# Patient Record
Sex: MALE | Race: WHITE | NOT HISPANIC OR LATINO | Employment: OTHER | URBAN - NONMETROPOLITAN AREA
[De-identification: names, ages, dates, MRNs, and addresses within clinical notes are randomized per-mention and may not be internally consistent; named-entity substitution may affect disease eponyms.]

---

## 2022-01-01 ENCOUNTER — DOCUMENTATION (OUTPATIENT)
Dept: CT IMAGING | Facility: HOSPITAL | Age: 52
End: 2022-01-01

## 2022-01-01 ENCOUNTER — APPOINTMENT (OUTPATIENT)
Dept: CT IMAGING | Facility: HOSPITAL | Age: 52
End: 2022-01-01

## 2022-01-01 ENCOUNTER — OFFICE VISIT (OUTPATIENT)
Dept: GASTROENTEROLOGY | Facility: CLINIC | Age: 52
End: 2022-01-01

## 2022-01-01 ENCOUNTER — HOSPITAL ENCOUNTER (EMERGENCY)
Facility: HOSPITAL | Age: 52
Discharge: HOME | End: 2022-04-27
Attending: EMERGENCY MEDICINE | Admitting: EMERGENCY MEDICINE

## 2022-01-01 ENCOUNTER — APPOINTMENT (OUTPATIENT)
Dept: ULTRASOUND IMAGING | Facility: HOSPITAL | Age: 52
End: 2022-01-01

## 2022-01-01 ENCOUNTER — TELEPHONE (OUTPATIENT)
Dept: UROLOGY | Facility: CLINIC | Age: 52
End: 2022-01-01

## 2022-01-01 ENCOUNTER — APPOINTMENT (OUTPATIENT)
Dept: CARDIOLOGY | Facility: HOSPITAL | Age: 52
End: 2022-01-01

## 2022-01-01 ENCOUNTER — OFFICE VISIT (OUTPATIENT)
Dept: UROLOGY | Facility: CLINIC | Age: 52
End: 2022-01-01

## 2022-01-01 ENCOUNTER — HOSPITAL ENCOUNTER (INPATIENT)
Facility: HOSPITAL | Age: 52
LOS: 4 days | Discharge: HOME | End: 2022-08-29
Attending: FAMILY MEDICINE | Admitting: INTERNAL MEDICINE

## 2022-01-01 ENCOUNTER — APPOINTMENT (OUTPATIENT)
Dept: GENERAL RADIOLOGY | Facility: HOSPITAL | Age: 52
End: 2022-01-01

## 2022-01-01 VITALS
SYSTOLIC BLOOD PRESSURE: 138 MMHG | DIASTOLIC BLOOD PRESSURE: 72 MMHG | BODY MASS INDEX: 31.5 KG/M2 | HEIGHT: 70 IN | HEART RATE: 79 BPM | WEIGHT: 220 LBS | OXYGEN SATURATION: 98 % | TEMPERATURE: 97.1 F

## 2022-01-01 VITALS
HEIGHT: 69 IN | HEART RATE: 71 BPM | TEMPERATURE: 98.8 F | SYSTOLIC BLOOD PRESSURE: 166 MMHG | OXYGEN SATURATION: 97 % | DIASTOLIC BLOOD PRESSURE: 81 MMHG | WEIGHT: 246 LBS | BODY MASS INDEX: 36.43 KG/M2 | RESPIRATION RATE: 18 BRPM

## 2022-01-01 VITALS
HEART RATE: 63 BPM | TEMPERATURE: 97.2 F | OXYGEN SATURATION: 94 % | RESPIRATION RATE: 18 BRPM | DIASTOLIC BLOOD PRESSURE: 59 MMHG | HEIGHT: 69 IN | BODY MASS INDEX: 38.03 KG/M2 | SYSTOLIC BLOOD PRESSURE: 130 MMHG | WEIGHT: 256.8 LBS

## 2022-01-01 VITALS — WEIGHT: 220 LBS | HEIGHT: 70 IN | BODY MASS INDEX: 31.5 KG/M2 | TEMPERATURE: 98.6 F

## 2022-01-01 DIAGNOSIS — Z86.16 HISTORY OF COVID-19: ICD-10-CM

## 2022-01-01 DIAGNOSIS — E80.6 HYPERBILIRUBINEMIA: ICD-10-CM

## 2022-01-01 DIAGNOSIS — R06.89 DECREASED LUNG SOUNDS: ICD-10-CM

## 2022-01-01 DIAGNOSIS — R60.1 ANASARCA: Primary | ICD-10-CM

## 2022-01-01 DIAGNOSIS — R19.37 GENERALIZED ABDOMINAL RIGIDITY: ICD-10-CM

## 2022-01-01 DIAGNOSIS — R63.4 WEIGHT LOSS: ICD-10-CM

## 2022-01-01 DIAGNOSIS — R63.0 LOSS OF APPETITE: ICD-10-CM

## 2022-01-01 DIAGNOSIS — Z82.49 FAMILY HISTORY OF CONGESTIVE HEART FAILURE: ICD-10-CM

## 2022-01-01 DIAGNOSIS — N50.819 PAIN IN TESTICLE, UNSPECIFIED LATERALITY: Primary | ICD-10-CM

## 2022-01-01 DIAGNOSIS — R14.0 ABDOMINAL DISTENTION: ICD-10-CM

## 2022-01-01 DIAGNOSIS — I50.33 ACUTE ON CHRONIC DIASTOLIC HEART FAILURE: ICD-10-CM

## 2022-01-01 DIAGNOSIS — R60.9 DEPENDENT EDEMA: ICD-10-CM

## 2022-01-01 DIAGNOSIS — I31.39 PERICARDIAL EFFUSION WITHOUT CARDIAC TAMPONADE: ICD-10-CM

## 2022-01-01 DIAGNOSIS — R60.0 BILATERAL LEG EDEMA: Primary | ICD-10-CM

## 2022-01-01 DIAGNOSIS — R53.83 MALAISE AND FATIGUE: ICD-10-CM

## 2022-01-01 DIAGNOSIS — R60.0 PEDAL EDEMA: ICD-10-CM

## 2022-01-01 DIAGNOSIS — N50.89 SCROTAL SWELLING: ICD-10-CM

## 2022-01-01 DIAGNOSIS — R01.1 MURMUR: ICD-10-CM

## 2022-01-01 DIAGNOSIS — R53.81 MALAISE AND FATIGUE: ICD-10-CM

## 2022-01-01 DIAGNOSIS — R93.89 ABNORMAL FINDING ON IMAGING: ICD-10-CM

## 2022-01-01 DIAGNOSIS — N50.89 SCROTAL EDEMA: ICD-10-CM

## 2022-01-01 DIAGNOSIS — R60.0 BILATERAL LOWER EXTREMITY EDEMA: Primary | ICD-10-CM

## 2022-01-01 DIAGNOSIS — R60.1 GENERALIZED EDEMA: ICD-10-CM

## 2022-01-01 LAB
ALBUMIN SERPL-MCNC: 3.7 G/DL (ref 3.5–5.2)
ALBUMIN SERPL-MCNC: 3.9 G/DL (ref 3.5–5.2)
ALBUMIN SERPL-MCNC: 3.9 G/DL (ref 3.5–5.2)
ALBUMIN SERPL-MCNC: 4.1 G/DL (ref 3.5–5.2)
ALBUMIN/GLOB SERPL: 1.7 G/DL
ALBUMIN/GLOB SERPL: 1.7 G/DL
ALBUMIN/GLOB SERPL: 1.8 G/DL
ALBUMIN/GLOB SERPL: 2.1 G/DL
ALP SERPL-CCNC: 111 U/L (ref 39–117)
ALP SERPL-CCNC: 121 U/L (ref 39–117)
ALP SERPL-CCNC: 126 U/L (ref 39–117)
ALP SERPL-CCNC: 86 U/L (ref 39–117)
ALT SERPL W P-5'-P-CCNC: 32 U/L (ref 1–41)
ALT SERPL W P-5'-P-CCNC: 67 U/L (ref 1–41)
ALT SERPL W P-5'-P-CCNC: 75 U/L (ref 1–41)
ALT SERPL W P-5'-P-CCNC: 75 U/L (ref 1–41)
ANION GAP SERPL CALCULATED.3IONS-SCNC: 10 MMOL/L (ref 5–15)
ANION GAP SERPL CALCULATED.3IONS-SCNC: 6 MMOL/L (ref 5–15)
ANION GAP SERPL CALCULATED.3IONS-SCNC: 8 MMOL/L (ref 5–15)
ANION GAP SERPL CALCULATED.3IONS-SCNC: 8 MMOL/L (ref 5–15)
ANION GAP SERPL CALCULATED.3IONS-SCNC: 9 MMOL/L (ref 5–15)
ANISOCYTOSIS BLD QL: ABNORMAL
AST SERPL-CCNC: 49 U/L (ref 1–40)
AST SERPL-CCNC: 63 U/L (ref 1–40)
AST SERPL-CCNC: 74 U/L (ref 1–40)
AST SERPL-CCNC: 76 U/L (ref 1–40)
B2 MICROGLOB SERPL-MCNC: 3.8 MG/L (ref 0.8–2.2)
BASOPHILS # BLD AUTO: 0.08 10*3/MM3 (ref 0–0.2)
BASOPHILS # BLD AUTO: 0.11 10*3/MM3 (ref 0–0.2)
BASOPHILS NFR BLD AUTO: 0.7 % (ref 0–1.5)
BASOPHILS NFR BLD AUTO: 0.8 % (ref 0–1.5)
BH CV ECHO LEFT VENTRICLE GLOBAL LONGITUDINAL STRAIN: -3.8 %
BH CV ECHO MEAS - AO MAX PG: 9.5 MMHG
BH CV ECHO MEAS - AO MEAN PG: 5 MMHG
BH CV ECHO MEAS - AO ROOT DIAM: 3.2 CM
BH CV ECHO MEAS - AO V2 MAX: 154 CM/SEC
BH CV ECHO MEAS - AO V2 VTI: 36.9 CM
BH CV ECHO MEAS - AVA(I,D): 3.2 CM2
BH CV ECHO MEAS - EDV(CUBED): 74.6 ML
BH CV ECHO MEAS - EDV(MOD-SP2): 112 ML
BH CV ECHO MEAS - EDV(MOD-SP4): 150 ML
BH CV ECHO MEAS - EF(MOD-BP): 70.6 %
BH CV ECHO MEAS - EF(MOD-SP2): 63.5 %
BH CV ECHO MEAS - EF(MOD-SP4): 75.2 %
BH CV ECHO MEAS - ESV(CUBED): 11.2 ML
BH CV ECHO MEAS - ESV(MOD-SP2): 40.9 ML
BH CV ECHO MEAS - ESV(MOD-SP4): 37.2 ML
BH CV ECHO MEAS - FS: 46.8 %
BH CV ECHO MEAS - IVS/LVPW: 0.83 CM
BH CV ECHO MEAS - IVSD: 1.53 CM
BH CV ECHO MEAS - LA DIMENSION: 4.3 CM
BH CV ECHO MEAS - LAT PEAK E' VEL: 12 CM/SEC
BH CV ECHO MEAS - LV DIASTOLIC VOL/BSA (35-75): 65.9 CM2
BH CV ECHO MEAS - LV MASS(C)D: 302.3 GRAMS
BH CV ECHO MEAS - LV MAX PG: 7 MMHG
BH CV ECHO MEAS - LV MEAN PG: 4 MMHG
BH CV ECHO MEAS - LV SYSTOLIC VOL/BSA (12-30): 16.4 CM2
BH CV ECHO MEAS - LV V1 MAX: 132 CM/SEC
BH CV ECHO MEAS - LV V1 VTI: 33.9 CM
BH CV ECHO MEAS - LVIDD: 4.2 CM
BH CV ECHO MEAS - LVIDS: 2.24 CM
BH CV ECHO MEAS - LVOT AREA: 3.5 CM2
BH CV ECHO MEAS - LVOT DIAM: 2.1 CM
BH CV ECHO MEAS - LVPWD: 1.85 CM
BH CV ECHO MEAS - MED PEAK E' VEL: 6.7 CM/SEC
BH CV ECHO MEAS - MV A MAX VEL: 73.7 CM/SEC
BH CV ECHO MEAS - MV DEC SLOPE: 719 CM/SEC2
BH CV ECHO MEAS - MV DEC TIME: 0.19 MSEC
BH CV ECHO MEAS - MV E MAX VEL: 137 CM/SEC
BH CV ECHO MEAS - MV E/A: 1.86
BH CV ECHO MEAS - MV MAX PG: 7.4 MMHG
BH CV ECHO MEAS - MV MEAN PG: 3 MMHG
BH CV ECHO MEAS - MV V2 VTI: 32.9 CM
BH CV ECHO MEAS - MVA(VTI): 3.6 CM2
BH CV ECHO MEAS - PA V2 MAX: 110 CM/SEC
BH CV ECHO MEAS - RAP SYSTOLE: 5 MMHG
BH CV ECHO MEAS - RV MAX PG: 3.4 MMHG
BH CV ECHO MEAS - RV V1 MAX: 91.6 CM/SEC
BH CV ECHO MEAS - RV V1 VTI: 19.2 CM
BH CV ECHO MEAS - RVDD: 4.1 CM
BH CV ECHO MEAS - RVSP: 45.4 MMHG
BH CV ECHO MEAS - SI(MOD-SP2): 31.3 ML/M2
BH CV ECHO MEAS - SI(MOD-SP4): 49.6 ML/M2
BH CV ECHO MEAS - SV(LVOT): 117.4 ML
BH CV ECHO MEAS - SV(MOD-SP2): 71.1 ML
BH CV ECHO MEAS - SV(MOD-SP4): 112.8 ML
BH CV ECHO MEAS - TR MAX PG: 40.4 MMHG
BH CV ECHO MEAS - TR MAX VEL: 318 CM/SEC
BH CV ECHO MEASUREMENTS AVERAGE E/E' RATIO: 14.65
BH CV XLRA - TDI S': 7.9 CM/SEC
BILIRUB SERPL-MCNC: 1.3 MG/DL (ref 0–1.2)
BILIRUB SERPL-MCNC: 1.5 MG/DL (ref 0–1.2)
BILIRUB SERPL-MCNC: 1.6 MG/DL (ref 0–1.2)
BILIRUB SERPL-MCNC: 2.5 MG/DL (ref 0–1.2)
BILIRUB UR QL STRIP: NEGATIVE
BILIRUB UR QL STRIP: NEGATIVE
BUN SERPL-MCNC: 13 MG/DL (ref 6–20)
BUN SERPL-MCNC: 15 MG/DL (ref 6–20)
BUN SERPL-MCNC: 15 MG/DL (ref 6–20)
BUN SERPL-MCNC: 18 MG/DL (ref 6–20)
BUN SERPL-MCNC: 6 MG/DL (ref 6–20)
BUN/CREAT SERPL: 15.3 (ref 7–25)
BUN/CREAT SERPL: 17.6 (ref 7–25)
BUN/CREAT SERPL: 19.2 (ref 7–25)
BUN/CREAT SERPL: 22.2 (ref 7–25)
BUN/CREAT SERPL: 9 (ref 7–25)
CALCIUM SPEC-SCNC: 8.4 MG/DL (ref 8.6–10.5)
CALCIUM SPEC-SCNC: 8.4 MG/DL (ref 8.6–10.5)
CALCIUM SPEC-SCNC: 8.5 MG/DL (ref 8.6–10.5)
CALCIUM SPEC-SCNC: 8.7 MG/DL (ref 8.6–10.5)
CALCIUM SPEC-SCNC: 9.2 MG/DL (ref 8.6–10.5)
CHLORIDE SERPL-SCNC: 107 MMOL/L (ref 98–107)
CHLORIDE SERPL-SCNC: 107 MMOL/L (ref 98–107)
CHLORIDE SERPL-SCNC: 108 MMOL/L (ref 98–107)
CHLORIDE SERPL-SCNC: 108 MMOL/L (ref 98–107)
CHLORIDE SERPL-SCNC: 113 MMOL/L (ref 98–107)
CHOLEST SERPL-MCNC: 83 MG/DL (ref 0–200)
CK MB SERPL-CCNC: 58.49 NG/ML
CK MB SERPL-CCNC: 59.34 NG/ML
CK SERPL-CCNC: 556 U/L (ref 20–200)
CK SERPL-CCNC: 641 U/L (ref 20–200)
CK SERPL-CCNC: 723 U/L (ref 20–200)
CK SERPL-CCNC: 920 U/L (ref 20–200)
CK SERPL-CCNC: 955 U/L (ref 20–200)
CLARITY UR: CLEAR
CLARITY UR: CLEAR
CLUMPED PLATELETS: PRESENT
CO2 SERPL-SCNC: 24 MMOL/L (ref 22–29)
CO2 SERPL-SCNC: 27 MMOL/L (ref 22–29)
CO2 SERPL-SCNC: 29 MMOL/L (ref 22–29)
CO2 SERPL-SCNC: 29 MMOL/L (ref 22–29)
CO2 SERPL-SCNC: 32 MMOL/L (ref 22–29)
COLOR UR: YELLOW
COLOR UR: YELLOW
CREAT SERPL-MCNC: 0.67 MG/DL (ref 0.76–1.27)
CREAT SERPL-MCNC: 0.78 MG/DL (ref 0.76–1.27)
CREAT SERPL-MCNC: 0.81 MG/DL (ref 0.76–1.27)
CREAT SERPL-MCNC: 0.85 MG/DL (ref 0.76–1.27)
CREAT SERPL-MCNC: 0.85 MG/DL (ref 0.76–1.27)
D DIMER PPP FEU-MCNC: 2.04 MCGFEU/ML (ref 0–0.5)
DEPRECATED RDW RBC AUTO: 69.3 FL (ref 37–54)
DEPRECATED RDW RBC AUTO: 71.1 FL (ref 37–54)
DEPRECATED RDW RBC AUTO: 71.9 FL (ref 37–54)
DEPRECATED RDW RBC AUTO: 74.1 FL (ref 37–54)
EGFRCR SERPLBLD CKD-EPI 2021: 105.9 ML/MIN/1.73
EGFRCR SERPLBLD CKD-EPI 2021: 105.9 ML/MIN/1.73
EGFRCR SERPLBLD CKD-EPI 2021: 107.4 ML/MIN/1.73
EGFRCR SERPLBLD CKD-EPI 2021: 108.6 ML/MIN/1.73
EGFRCR SERPLBLD CKD-EPI 2021: 113.7 ML/MIN/1.73
EOSINOPHIL # BLD AUTO: 0.17 10*3/MM3 (ref 0–0.4)
EOSINOPHIL # BLD AUTO: 0.21 10*3/MM3 (ref 0–0.4)
EOSINOPHIL # BLD MANUAL: 0.55 10*3/MM3 (ref 0–0.4)
EOSINOPHIL NFR BLD AUTO: 1.1 % (ref 0.3–6.2)
EOSINOPHIL NFR BLD AUTO: 2 % (ref 0.3–6.2)
EOSINOPHIL NFR BLD MANUAL: 4 % (ref 0.3–6.2)
ERYTHROCYTE [DISTWIDTH] IN BLOOD BY AUTOMATED COUNT: 18.6 % (ref 12.3–15.4)
ERYTHROCYTE [DISTWIDTH] IN BLOOD BY AUTOMATED COUNT: 18.9 % (ref 12.3–15.4)
ERYTHROCYTE [DISTWIDTH] IN BLOOD BY AUTOMATED COUNT: 19 % (ref 12.3–15.4)
ERYTHROCYTE [DISTWIDTH] IN BLOOD BY AUTOMATED COUNT: 19.3 % (ref 12.3–15.4)
FERRITIN SERPL-MCNC: 89.21 NG/ML (ref 30–400)
GLOBULIN UR ELPH-MCNC: 2 GM/DL
GLOBULIN UR ELPH-MCNC: 2.2 GM/DL
GLOBULIN UR ELPH-MCNC: 2.2 GM/DL
GLOBULIN UR ELPH-MCNC: 2.3 GM/DL
GLUCOSE BLDC GLUCOMTR-MCNC: 115 MG/DL (ref 70–130)
GLUCOSE BLDC GLUCOMTR-MCNC: 122 MG/DL (ref 70–130)
GLUCOSE BLDC GLUCOMTR-MCNC: 135 MG/DL (ref 70–130)
GLUCOSE BLDC GLUCOMTR-MCNC: 144 MG/DL (ref 70–130)
GLUCOSE BLDC GLUCOMTR-MCNC: 158 MG/DL (ref 70–130)
GLUCOSE BLDC GLUCOMTR-MCNC: 165 MG/DL (ref 70–130)
GLUCOSE BLDC GLUCOMTR-MCNC: 172 MG/DL (ref 70–130)
GLUCOSE BLDC GLUCOMTR-MCNC: 174 MG/DL (ref 70–130)
GLUCOSE BLDC GLUCOMTR-MCNC: 184 MG/DL (ref 70–130)
GLUCOSE BLDC GLUCOMTR-MCNC: 185 MG/DL (ref 70–130)
GLUCOSE BLDC GLUCOMTR-MCNC: 188 MG/DL (ref 70–130)
GLUCOSE BLDC GLUCOMTR-MCNC: 205 MG/DL (ref 70–130)
GLUCOSE BLDC GLUCOMTR-MCNC: 209 MG/DL (ref 70–130)
GLUCOSE BLDC GLUCOMTR-MCNC: 238 MG/DL (ref 70–130)
GLUCOSE SERPL-MCNC: 133 MG/DL (ref 65–99)
GLUCOSE SERPL-MCNC: 157 MG/DL (ref 65–99)
GLUCOSE SERPL-MCNC: 160 MG/DL (ref 65–99)
GLUCOSE SERPL-MCNC: 194 MG/DL (ref 65–99)
GLUCOSE SERPL-MCNC: 91 MG/DL (ref 65–99)
GLUCOSE UR STRIP-MCNC: ABNORMAL MG/DL
GLUCOSE UR STRIP-MCNC: ABNORMAL MG/DL
HBA1C MFR BLD: 6 % (ref 4.8–5.6)
HCT VFR BLD AUTO: 32.8 % (ref 37.5–51)
HCT VFR BLD AUTO: 35.2 % (ref 37.5–51)
HCT VFR BLD AUTO: 36 % (ref 37.5–51)
HCT VFR BLD AUTO: 37 % (ref 37.5–51)
HDLC SERPL-MCNC: 36 MG/DL (ref 40–60)
HGB BLD-MCNC: 10.3 G/DL (ref 13–17.7)
HGB BLD-MCNC: 10.9 G/DL (ref 13–17.7)
HGB BLD-MCNC: 11.1 G/DL (ref 13–17.7)
HGB BLD-MCNC: 11.1 G/DL (ref 13–17.7)
HGB UR QL STRIP.AUTO: NEGATIVE
HGB UR QL STRIP.AUTO: NEGATIVE
IMM GRANULOCYTES # BLD AUTO: 0.25 10*3/MM3 (ref 0–0.05)
IMM GRANULOCYTES # BLD AUTO: 0.32 10*3/MM3 (ref 0–0.05)
IMM GRANULOCYTES NFR BLD AUTO: 2.2 % (ref 0–0.5)
IMM GRANULOCYTES NFR BLD AUTO: 2.4 % (ref 0–0.5)
INR PPP: 1.22 (ref 0.91–1.09)
IRON 24H UR-MRATE: 30 MCG/DL (ref 59–158)
IRON SATN MFR SERPL: 8 % (ref 20–50)
KETONES UR QL STRIP: NEGATIVE
KETONES UR QL STRIP: NEGATIVE
LDH SERPL-CCNC: 457 U/L (ref 135–225)
LDLC SERPL CALC-MCNC: 27 MG/DL (ref 0–100)
LDLC/HDLC SERPL: 0.72 {RATIO}
LEFT ATRIUM VOLUME INDEX: 48.2 ML/M2
LEFT ATRIUM VOLUME: 110 ML
LEUKOCYTE ESTERASE UR QL STRIP.AUTO: NEGATIVE
LEUKOCYTE ESTERASE UR QL STRIP.AUTO: NEGATIVE
LIPASE SERPL-CCNC: 26 U/L (ref 13–60)
LYMPHOCYTES # BLD AUTO: 0.79 10*3/MM3 (ref 0.7–3.1)
LYMPHOCYTES # BLD AUTO: 0.83 10*3/MM3 (ref 0.7–3.1)
LYMPHOCYTES # BLD MANUAL: 0.97 10*3/MM3 (ref 0.7–3.1)
LYMPHOCYTES NFR BLD AUTO: 5.3 % (ref 19.6–45.3)
LYMPHOCYTES NFR BLD AUTO: 8 % (ref 19.6–45.3)
LYMPHOCYTES NFR BLD MANUAL: 13.1 % (ref 5–12)
MACROCYTES BLD QL SMEAR: ABNORMAL
MAXIMAL PREDICTED HEART RATE: 170 BPM
MCH RBC QN AUTO: 31.7 PG (ref 26.6–33)
MCH RBC QN AUTO: 32.1 PG (ref 26.6–33)
MCH RBC QN AUTO: 32.3 PG (ref 26.6–33)
MCH RBC QN AUTO: 32.4 PG (ref 26.6–33)
MCHC RBC AUTO-ENTMCNC: 30 G/DL (ref 31.5–35.7)
MCHC RBC AUTO-ENTMCNC: 30.8 G/DL (ref 31.5–35.7)
MCHC RBC AUTO-ENTMCNC: 31 G/DL (ref 31.5–35.7)
MCHC RBC AUTO-ENTMCNC: 31.4 G/DL (ref 31.5–35.7)
MCV RBC AUTO: 103.1 FL (ref 79–97)
MCV RBC AUTO: 104 FL (ref 79–97)
MCV RBC AUTO: 104.5 FL (ref 79–97)
MCV RBC AUTO: 105.7 FL (ref 79–97)
MONOCYTES # BLD AUTO: 1.78 10*3/MM3 (ref 0.1–0.9)
MONOCYTES # BLD AUTO: 1.94 10*3/MM3 (ref 0.1–0.9)
MONOCYTES # BLD: 1.79 10*3/MM3 (ref 0.1–0.9)
MONOCYTES NFR BLD AUTO: 12 % (ref 5–12)
MONOCYTES NFR BLD AUTO: 18.6 % (ref 5–12)
MYELOCYTES NFR BLD MANUAL: 1 % (ref 0–0)
NEUTROPHILS # BLD AUTO: 10.23 10*3/MM3 (ref 1.7–7)
NEUTROPHILS NFR BLD AUTO: 11.69 10*3/MM3 (ref 1.7–7)
NEUTROPHILS NFR BLD AUTO: 68.2 % (ref 42.7–76)
NEUTROPHILS NFR BLD AUTO: 7.12 10*3/MM3 (ref 1.7–7)
NEUTROPHILS NFR BLD AUTO: 78.7 % (ref 42.7–76)
NEUTROPHILS NFR BLD MANUAL: 69.7 % (ref 42.7–76)
NEUTS BAND NFR BLD MANUAL: 5.1 % (ref 0–5)
NITRITE UR QL STRIP: NEGATIVE
NITRITE UR QL STRIP: NEGATIVE
NRBC BLD AUTO-RTO: 0 /100 WBC (ref 0–0.2)
NRBC BLD AUTO-RTO: 0 /100 WBC (ref 0–0.2)
NT-PROBNP SERPL-MCNC: 1099 PG/ML (ref 0–900)
NT-PROBNP SERPL-MCNC: 439.1 PG/ML (ref 0–900)
PH UR STRIP.AUTO: 5.5 [PH] (ref 5–8)
PH UR STRIP.AUTO: 6 [PH] (ref 5–8)
PLATELET # BLD AUTO: 336 10*3/MM3 (ref 140–450)
PLATELET # BLD AUTO: 379 10*3/MM3 (ref 140–450)
PLATELET # BLD AUTO: 384 10*3/MM3 (ref 140–450)
PLATELET # BLD AUTO: 423 10*3/MM3 (ref 140–450)
PMV BLD AUTO: 10.2 FL (ref 6–12)
PMV BLD AUTO: 9.6 FL (ref 6–12)
PMV BLD AUTO: 9.8 FL (ref 6–12)
PMV BLD AUTO: 9.9 FL (ref 6–12)
POIKILOCYTOSIS BLD QL SMEAR: ABNORMAL
POTASSIUM SERPL-SCNC: 3.8 MMOL/L (ref 3.5–5.2)
POTASSIUM SERPL-SCNC: 3.9 MMOL/L (ref 3.5–5.2)
POTASSIUM SERPL-SCNC: 4.4 MMOL/L (ref 3.5–5.2)
PROT SERPL-MCNC: 5.9 G/DL (ref 6–8.5)
PROT SERPL-MCNC: 6.1 G/DL (ref 6–8.5)
PROT SERPL-MCNC: 6.1 G/DL (ref 6–8.5)
PROT SERPL-MCNC: 6.2 G/DL (ref 6–8.5)
PROT UR QL STRIP: ABNORMAL
PROT UR QL STRIP: NEGATIVE
PROTHROMBIN TIME: 14.9 SECONDS (ref 11.9–14.6)
QT INTERVAL: 410 MS
QTC INTERVAL: 461 MS
RBC # BLD AUTO: 3.18 10*6/MM3 (ref 4.14–5.8)
RBC # BLD AUTO: 3.37 10*6/MM3 (ref 4.14–5.8)
RBC # BLD AUTO: 3.46 10*6/MM3 (ref 4.14–5.8)
RBC # BLD AUTO: 3.5 10*6/MM3 (ref 4.14–5.8)
RETICS # AUTO: 0.07 10*6/MM3 (ref 0.02–0.13)
RETICS/RBC NFR AUTO: 2.11 % (ref 0.7–1.9)
SODIUM SERPL-SCNC: 144 MMOL/L (ref 136–145)
SODIUM SERPL-SCNC: 145 MMOL/L (ref 136–145)
SODIUM SERPL-SCNC: 146 MMOL/L (ref 136–145)
SP GR UR STRIP: 1.01 (ref 1–1.03)
SP GR UR STRIP: >1.03 (ref 1–1.03)
STRESS TARGET HR: 145 BPM
T3FREE SERPL-MCNC: 1.95 PG/ML (ref 2–4.4)
T4 FREE SERPL-MCNC: 1.28 NG/DL (ref 0.93–1.7)
T4 FREE SERPL-MCNC: 1.5 NG/DL (ref 0.93–1.7)
TIBC SERPL-MCNC: 374 MCG/DL (ref 298–536)
TRANSFERRIN SERPL-MCNC: 251 MG/DL (ref 200–360)
TRIGL SERPL-MCNC: 106 MG/DL (ref 0–150)
TROPONIN T SERPL-MCNC: 0.44 NG/ML (ref 0–0.03)
TROPONIN T SERPL-MCNC: 0.52 NG/ML (ref 0–0.03)
TSH SERPL DL<=0.05 MIU/L-ACNC: 4.52 UIU/ML (ref 0.27–4.2)
TSH SERPL DL<=0.05 MIU/L-ACNC: 6.03 UIU/ML (ref 0.27–4.2)
UROBILINOGEN UR QL STRIP: ABNORMAL
UROBILINOGEN UR QL STRIP: ABNORMAL
VARIANT LYMPHS NFR BLD MANUAL: 7.1 % (ref 19.6–45.3)
VIT B12 BLD-MCNC: 1480 PG/ML (ref 211–946)
VLDLC SERPL-MCNC: 20 MG/DL (ref 5–40)
WBC MORPH BLD: NORMAL
WBC NRBC COR # BLD: 10.43 10*3/MM3 (ref 3.4–10.8)
WBC NRBC COR # BLD: 11.9 10*3/MM3 (ref 3.4–10.8)
WBC NRBC COR # BLD: 13.69 10*3/MM3 (ref 3.4–10.8)
WBC NRBC COR # BLD: 14.86 10*3/MM3 (ref 3.4–10.8)

## 2022-01-01 PROCEDURE — 93356 MYOCRD STRAIN IMG SPCKL TRCK: CPT

## 2022-01-01 PROCEDURE — 99204 OFFICE O/P NEW MOD 45 MIN: CPT | Performed by: NURSE PRACTITIONER

## 2022-01-01 PROCEDURE — 83880 ASSAY OF NATRIURETIC PEPTIDE: CPT

## 2022-01-01 PROCEDURE — 25010000002 NA FERRIC GLUC CPLX PER 12.5 MG: Performed by: NURSE PRACTITIONER

## 2022-01-01 PROCEDURE — 82550 ASSAY OF CK (CPK): CPT | Performed by: NURSE PRACTITIONER

## 2022-01-01 PROCEDURE — 83521 IG LIGHT CHAINS FREE EACH: CPT | Performed by: INTERNAL MEDICINE

## 2022-01-01 PROCEDURE — 85027 COMPLETE CBC AUTOMATED: CPT

## 2022-01-01 PROCEDURE — 25010000002 PIPERACILLIN SOD-TAZOBACTAM PER 1 G: Performed by: NURSE PRACTITIONER

## 2022-01-01 PROCEDURE — 93970 EXTREMITY STUDY: CPT | Performed by: SURGERY

## 2022-01-01 PROCEDURE — 84166 PROTEIN E-PHORESIS/URINE/CSF: CPT | Performed by: INTERNAL MEDICINE

## 2022-01-01 PROCEDURE — 25010000002 ENOXAPARIN PER 10 MG: Performed by: NURSE PRACTITIONER

## 2022-01-01 PROCEDURE — 80048 BASIC METABOLIC PNL TOTAL CA: CPT

## 2022-01-01 PROCEDURE — 83540 ASSAY OF IRON: CPT | Performed by: NURSE PRACTITIONER

## 2022-01-01 PROCEDURE — 82962 GLUCOSE BLOOD TEST: CPT

## 2022-01-01 PROCEDURE — 82728 ASSAY OF FERRITIN: CPT | Performed by: NURSE PRACTITIONER

## 2022-01-01 PROCEDURE — 82550 ASSAY OF CK (CPK): CPT | Performed by: INTERNAL MEDICINE

## 2022-01-01 PROCEDURE — 71275 CT ANGIOGRAPHY CHEST: CPT

## 2022-01-01 PROCEDURE — 84443 ASSAY THYROID STIM HORMONE: CPT | Performed by: EMERGENCY MEDICINE

## 2022-01-01 PROCEDURE — 93970 EXTREMITY STUDY: CPT

## 2022-01-01 PROCEDURE — 83690 ASSAY OF LIPASE: CPT | Performed by: EMERGENCY MEDICINE

## 2022-01-01 PROCEDURE — 81050 URINALYSIS VOLUME MEASURE: CPT | Performed by: INTERNAL MEDICINE

## 2022-01-01 PROCEDURE — 82550 ASSAY OF CK (CPK): CPT

## 2022-01-01 PROCEDURE — 82784 ASSAY IGA/IGD/IGG/IGM EACH: CPT | Performed by: INTERNAL MEDICINE

## 2022-01-01 PROCEDURE — 99222 1ST HOSP IP/OBS MODERATE 55: CPT | Performed by: INTERNAL MEDICINE

## 2022-01-01 PROCEDURE — 0 IOPAMIDOL PER 1 ML: Performed by: FAMILY MEDICINE

## 2022-01-01 PROCEDURE — 63710000001 INSULIN LISPRO (HUMAN) PER 5 UNITS: Performed by: NURSE PRACTITIONER

## 2022-01-01 PROCEDURE — 83880 ASSAY OF NATRIURETIC PEPTIDE: CPT | Performed by: EMERGENCY MEDICINE

## 2022-01-01 PROCEDURE — 82607 VITAMIN B-12: CPT | Performed by: NURSE PRACTITIONER

## 2022-01-01 PROCEDURE — 83615 LACTATE (LD) (LDH) ENZYME: CPT | Performed by: NURSE PRACTITIONER

## 2022-01-01 PROCEDURE — 85379 FIBRIN DEGRADATION QUANT: CPT | Performed by: NURSE PRACTITIONER

## 2022-01-01 PROCEDURE — 74177 CT ABD & PELVIS W/CONTRAST: CPT

## 2022-01-01 PROCEDURE — 84466 ASSAY OF TRANSFERRIN: CPT | Performed by: NURSE PRACTITIONER

## 2022-01-01 PROCEDURE — 93005 ELECTROCARDIOGRAM TRACING: CPT | Performed by: NURSE PRACTITIONER

## 2022-01-01 PROCEDURE — 99233 SBSQ HOSP IP/OBS HIGH 50: CPT | Performed by: INTERNAL MEDICINE

## 2022-01-01 PROCEDURE — 93306 TTE W/DOPPLER COMPLETE: CPT | Performed by: INTERNAL MEDICINE

## 2022-01-01 PROCEDURE — 81003 URINALYSIS AUTO W/O SCOPE: CPT | Performed by: NURSE PRACTITIONER

## 2022-01-01 PROCEDURE — 76705 ECHO EXAM OF ABDOMEN: CPT

## 2022-01-01 PROCEDURE — 84439 ASSAY OF FREE THYROXINE: CPT | Performed by: EMERGENCY MEDICINE

## 2022-01-01 PROCEDURE — 71045 X-RAY EXAM CHEST 1 VIEW: CPT

## 2022-01-01 PROCEDURE — 94618 PULMONARY STRESS TESTING: CPT

## 2022-01-01 PROCEDURE — 84484 ASSAY OF TROPONIN QUANT: CPT | Performed by: NURSE PRACTITIONER

## 2022-01-01 PROCEDURE — 80053 COMPREHEN METABOLIC PANEL: CPT | Performed by: NURSE PRACTITIONER

## 2022-01-01 PROCEDURE — 25010000002 IOPAMIDOL 61 % SOLUTION: Performed by: EMERGENCY MEDICINE

## 2022-01-01 PROCEDURE — 99283 EMERGENCY DEPT VISIT LOW MDM: CPT

## 2022-01-01 PROCEDURE — 85045 AUTOMATED RETICULOCYTE COUNT: CPT | Performed by: NURSE PRACTITIONER

## 2022-01-01 PROCEDURE — 85025 COMPLETE CBC W/AUTO DIFF WBC: CPT | Performed by: EMERGENCY MEDICINE

## 2022-01-01 PROCEDURE — 93356 MYOCRD STRAIN IMG SPCKL TRCK: CPT | Performed by: INTERNAL MEDICINE

## 2022-01-01 PROCEDURE — 82232 ASSAY OF BETA-2 PROTEIN: CPT | Performed by: INTERNAL MEDICINE

## 2022-01-01 PROCEDURE — 85007 BL SMEAR W/DIFF WBC COUNT: CPT | Performed by: NURSE PRACTITIONER

## 2022-01-01 PROCEDURE — 85025 COMPLETE CBC W/AUTO DIFF WBC: CPT | Performed by: NURSE PRACTITIONER

## 2022-01-01 PROCEDURE — 93306 TTE W/DOPPLER COMPLETE: CPT

## 2022-01-01 PROCEDURE — 84443 ASSAY THYROID STIM HORMONE: CPT | Performed by: NURSE PRACTITIONER

## 2022-01-01 PROCEDURE — 93010 ELECTROCARDIOGRAM REPORT: CPT | Performed by: INTERNAL MEDICINE

## 2022-01-01 PROCEDURE — 25010000002 PERFLUTREN 6.52 MG/ML SUSPENSION: Performed by: INTERNAL MEDICINE

## 2022-01-01 PROCEDURE — 80053 COMPREHEN METABOLIC PANEL: CPT | Performed by: INTERNAL MEDICINE

## 2022-01-01 PROCEDURE — 80053 COMPREHEN METABOLIC PANEL: CPT | Performed by: EMERGENCY MEDICINE

## 2022-01-01 PROCEDURE — 84439 ASSAY OF FREE THYROXINE: CPT | Performed by: NURSE PRACTITIONER

## 2022-01-01 PROCEDURE — 84165 PROTEIN E-PHORESIS SERUM: CPT | Performed by: INTERNAL MEDICINE

## 2022-01-01 PROCEDURE — 82553 CREATINE MB FRACTION: CPT | Performed by: NURSE PRACTITIONER

## 2022-01-01 PROCEDURE — 83036 HEMOGLOBIN GLYCOSYLATED A1C: CPT | Performed by: NURSE PRACTITIONER

## 2022-01-01 PROCEDURE — 82785 ASSAY OF IGE: CPT | Performed by: INTERNAL MEDICINE

## 2022-01-01 PROCEDURE — 85610 PROTHROMBIN TIME: CPT | Performed by: NURSE PRACTITIONER

## 2022-01-01 PROCEDURE — 80061 LIPID PANEL: CPT | Performed by: NURSE PRACTITIONER

## 2022-01-01 PROCEDURE — 84156 ASSAY OF PROTEIN URINE: CPT | Performed by: INTERNAL MEDICINE

## 2022-01-01 PROCEDURE — 84481 FREE ASSAY (FT-3): CPT | Performed by: NURSE PRACTITIONER

## 2022-01-01 PROCEDURE — 99203 OFFICE O/P NEW LOW 30 MIN: CPT | Performed by: PHYSICIAN ASSISTANT

## 2022-01-01 PROCEDURE — 81003 URINALYSIS AUTO W/O SCOPE: CPT | Performed by: EMERGENCY MEDICINE

## 2022-01-01 RX ORDER — PIOGLITAZONEHYDROCHLORIDE 15 MG/1
15 TABLET ORAL DAILY
COMMUNITY
Start: 2022-01-01

## 2022-01-01 RX ORDER — NEBIVOLOL 2.5 MG/1
2.5 TABLET ORAL
Qty: 30 TABLET | Refills: 0 | Status: SHIPPED | OUTPATIENT
Start: 2022-08-30

## 2022-01-01 RX ORDER — ENOXAPARIN SODIUM 100 MG/ML
40 INJECTION SUBCUTANEOUS
Status: DISCONTINUED | OUTPATIENT
Start: 2022-01-01 | End: 2022-01-01 | Stop reason: HOSPADM

## 2022-01-01 RX ORDER — RISPERIDONE 1 MG/1
0.5 TABLET ORAL DAILY
Status: DISCONTINUED | OUTPATIENT
Start: 2022-01-01 | End: 2022-01-01 | Stop reason: HOSPADM

## 2022-01-01 RX ORDER — RISPERIDONE 0.5 MG/1
0.5 TABLET ORAL DAILY
COMMUNITY
Start: 2022-01-01

## 2022-01-01 RX ORDER — GLIPIZIDE 10 MG/1
10 TABLET ORAL DAILY
COMMUNITY
Start: 2022-01-01

## 2022-01-01 RX ORDER — HYDROXYZINE HYDROCHLORIDE 25 MG/1
25 TABLET, FILM COATED ORAL 3 TIMES DAILY PRN
Status: DISCONTINUED | OUTPATIENT
Start: 2022-01-01 | End: 2022-01-01 | Stop reason: HOSPADM

## 2022-01-01 RX ORDER — HYDROXYZINE HYDROCHLORIDE 25 MG/1
25 TABLET, FILM COATED ORAL 3 TIMES DAILY PRN
COMMUNITY
Start: 2022-01-01

## 2022-01-01 RX ORDER — INSULIN LISPRO 100 [IU]/ML
0-9 INJECTION, SOLUTION INTRAVENOUS; SUBCUTANEOUS
Status: DISCONTINUED | OUTPATIENT
Start: 2022-01-01 | End: 2022-01-01 | Stop reason: HOSPADM

## 2022-01-01 RX ORDER — DEXTROSE MONOHYDRATE 25 G/50ML
25 INJECTION, SOLUTION INTRAVENOUS
Status: DISCONTINUED | OUTPATIENT
Start: 2022-01-01 | End: 2022-01-01 | Stop reason: HOSPADM

## 2022-01-01 RX ORDER — AMOXICILLIN AND CLAVULANATE POTASSIUM 875; 125 MG/1; MG/1
1 TABLET, FILM COATED ORAL 2 TIMES DAILY
Qty: 3 TABLET | Refills: 0 | Status: SHIPPED | OUTPATIENT
Start: 2022-01-01 | End: 2022-08-31

## 2022-01-01 RX ORDER — SITAGLIPTIN 100 MG/1
100 TABLET, FILM COATED ORAL DAILY
COMMUNITY
Start: 2022-01-01

## 2022-01-01 RX ORDER — SODIUM CHLORIDE 0.9 % (FLUSH) 0.9 %
10 SYRINGE (ML) INJECTION AS NEEDED
Status: DISCONTINUED | OUTPATIENT
Start: 2022-01-01 | End: 2022-01-01 | Stop reason: HOSPADM

## 2022-01-01 RX ORDER — CIPROFLOXACIN 500 MG/1
500 TABLET, FILM COATED ORAL 2 TIMES DAILY
Qty: 28 TABLET | Refills: 0 | Status: SHIPPED | OUTPATIENT
Start: 2022-01-01 | End: 2022-01-01

## 2022-01-01 RX ORDER — SODIUM CHLORIDE 0.9 % (FLUSH) 0.9 %
10 SYRINGE (ML) INJECTION EVERY 12 HOURS SCHEDULED
Status: DISCONTINUED | OUTPATIENT
Start: 2022-01-01 | End: 2022-01-01 | Stop reason: HOSPADM

## 2022-01-01 RX ORDER — LISINOPRIL 20 MG/1
20 TABLET ORAL
Status: DISCONTINUED | OUTPATIENT
Start: 2022-01-01 | End: 2022-01-01

## 2022-01-01 RX ORDER — FUROSEMIDE 40 MG/1
40 TABLET ORAL DAILY
Qty: 10 TABLET | Refills: 0 | Status: SHIPPED | OUTPATIENT
Start: 2022-01-01 | End: 2022-01-01 | Stop reason: HOSPADM

## 2022-01-01 RX ORDER — BUMETANIDE 1 MG/1
0.5 TABLET ORAL 2 TIMES DAILY
Status: DISCONTINUED | OUTPATIENT
Start: 2022-01-01 | End: 2022-01-01 | Stop reason: HOSPADM

## 2022-01-01 RX ORDER — DAPAGLIFLOZIN 5 MG/1
5 TABLET, FILM COATED ORAL DAILY
COMMUNITY
Start: 2022-01-01

## 2022-01-01 RX ORDER — NEBIVOLOL 5 MG/1
2.5 TABLET ORAL
Status: DISCONTINUED | OUTPATIENT
Start: 2022-01-01 | End: 2022-01-01 | Stop reason: HOSPADM

## 2022-01-01 RX ORDER — NICOTINE POLACRILEX 4 MG
15 LOZENGE BUCCAL
Status: DISCONTINUED | OUTPATIENT
Start: 2022-01-01 | End: 2022-01-01 | Stop reason: HOSPADM

## 2022-01-01 RX ORDER — GLIPIZIDE 10 MG/1
10 TABLET ORAL DAILY
Status: DISCONTINUED | OUTPATIENT
Start: 2022-01-01 | End: 2022-01-01 | Stop reason: HOSPADM

## 2022-01-01 RX ORDER — BUMETANIDE 0.25 MG/ML
1 INJECTION INTRAMUSCULAR; INTRAVENOUS EVERY 12 HOURS
Status: DISCONTINUED | OUTPATIENT
Start: 2022-01-01 | End: 2022-01-01

## 2022-01-01 RX ORDER — BENAZEPRIL HYDROCHLORIDE 20 MG/1
20 TABLET ORAL DAILY
COMMUNITY
Start: 2022-01-01 | End: 2022-01-01 | Stop reason: HOSPADM

## 2022-01-01 RX ORDER — ATORVASTATIN CALCIUM 10 MG/1
10 TABLET, FILM COATED ORAL DAILY
Status: DISCONTINUED | OUTPATIENT
Start: 2022-01-01 | End: 2022-01-01 | Stop reason: HOSPADM

## 2022-01-01 RX ORDER — LEVOTHYROXINE SODIUM 0.12 MG/1
125 TABLET ORAL
Status: DISCONTINUED | OUTPATIENT
Start: 2022-01-01 | End: 2022-01-01 | Stop reason: HOSPADM

## 2022-01-01 RX ORDER — ATORVASTATIN CALCIUM 10 MG/1
10 TABLET, FILM COATED ORAL DAILY
COMMUNITY
Start: 2022-01-01

## 2022-01-01 RX ORDER — BUMETANIDE 0.5 MG/1
0.5 TABLET ORAL 2 TIMES DAILY
COMMUNITY

## 2022-01-01 RX ORDER — LEVOTHYROXINE SODIUM 0.12 MG/1
125 TABLET ORAL DAILY
COMMUNITY
Start: 2022-01-01

## 2022-01-01 RX ORDER — MULTIPLE VITAMINS W/ MINERALS TAB 9MG-400MCG
1 TAB ORAL DAILY
COMMUNITY

## 2022-01-01 RX ORDER — FUROSEMIDE 10 MG/ML
40 INJECTION INTRAMUSCULAR; INTRAVENOUS EVERY 12 HOURS
Status: DISCONTINUED | OUTPATIENT
Start: 2022-01-01 | End: 2022-01-01

## 2022-01-01 RX ADMIN — SODIUM CHLORIDE 250 MG: 9 INJECTION, SOLUTION INTRAVENOUS at 09:30

## 2022-01-01 RX ADMIN — NEBIVOLOL HYDROCHLORIDE 2.5 MG: 5 TABLET ORAL at 09:16

## 2022-01-01 RX ADMIN — BUMETANIDE 1 MG: 0.25 INJECTION, SOLUTION INTRAMUSCULAR; INTRAVENOUS at 09:29

## 2022-01-01 RX ADMIN — TAZOBACTAM SODIUM AND PIPERACILLIN SODIUM 3.38 G: 375; 3 INJECTION, SOLUTION INTRAVENOUS at 16:30

## 2022-01-01 RX ADMIN — PERFLUTREN 13.04 MG: 6.52 INJECTION, SUSPENSION INTRAVENOUS at 09:03

## 2022-01-01 RX ADMIN — Medication 10 ML: at 20:46

## 2022-01-01 RX ADMIN — INSULIN LISPRO 2 UNITS: 100 INJECTION, SOLUTION INTRAVENOUS; SUBCUTANEOUS at 11:33

## 2022-01-01 RX ADMIN — LINAGLIPTIN 5 MG: 5 TABLET, FILM COATED ORAL at 09:29

## 2022-01-01 RX ADMIN — GLIPIZIDE 10 MG: 10 TABLET ORAL at 09:54

## 2022-01-01 RX ADMIN — LINAGLIPTIN 5 MG: 5 TABLET, FILM COATED ORAL at 08:17

## 2022-01-01 RX ADMIN — HYDROXYZINE HYDROCHLORIDE 25 MG: 25 TABLET, FILM COATED ORAL at 20:45

## 2022-01-01 RX ADMIN — Medication 10 ML: at 21:12

## 2022-01-01 RX ADMIN — Medication 10 ML: at 20:24

## 2022-01-01 RX ADMIN — GLIPIZIDE 10 MG: 10 TABLET ORAL at 09:29

## 2022-01-01 RX ADMIN — Medication 10 ML: at 09:29

## 2022-01-01 RX ADMIN — TAZOBACTAM SODIUM AND PIPERACILLIN SODIUM 3.38 G: 375; 3 INJECTION, SOLUTION INTRAVENOUS at 17:32

## 2022-01-01 RX ADMIN — LEVOTHYROXINE SODIUM 125 MCG: 125 TABLET ORAL at 05:03

## 2022-01-01 RX ADMIN — TAZOBACTAM SODIUM AND PIPERACILLIN SODIUM 3.38 G: 375; 3 INJECTION, SOLUTION INTRAVENOUS at 16:54

## 2022-01-01 RX ADMIN — ATORVASTATIN CALCIUM 10 MG: 10 TABLET, FILM COATED ORAL at 08:17

## 2022-01-01 RX ADMIN — LEVOTHYROXINE SODIUM 125 MCG: 125 TABLET ORAL at 05:00

## 2022-01-01 RX ADMIN — GLIPIZIDE 10 MG: 10 TABLET ORAL at 08:16

## 2022-01-01 RX ADMIN — RISPERIDONE 0.5 MG: 1 TABLET, FILM COATED ORAL at 09:54

## 2022-01-01 RX ADMIN — BUMETANIDE 1 MG: 0.25 INJECTION, SOLUTION INTRAMUSCULAR; INTRAVENOUS at 21:12

## 2022-01-01 RX ADMIN — BUMETANIDE 0.5 MG: 1 TABLET ORAL at 08:17

## 2022-01-01 RX ADMIN — ATORVASTATIN CALCIUM 10 MG: 10 TABLET, FILM COATED ORAL at 09:29

## 2022-01-01 RX ADMIN — TAZOBACTAM SODIUM AND PIPERACILLIN SODIUM 3.38 G: 375; 3 INJECTION, SOLUTION INTRAVENOUS at 02:04

## 2022-01-01 RX ADMIN — BUMETANIDE 0.5 MG: 1 TABLET ORAL at 20:23

## 2022-01-01 RX ADMIN — SODIUM CHLORIDE 250 MG: 9 INJECTION, SOLUTION INTRAVENOUS at 09:56

## 2022-01-01 RX ADMIN — BUMETANIDE 1 MG: 0.25 INJECTION, SOLUTION INTRAMUSCULAR; INTRAVENOUS at 08:00

## 2022-01-01 RX ADMIN — INSULIN LISPRO 2 UNITS: 100 INJECTION, SOLUTION INTRAVENOUS; SUBCUTANEOUS at 16:30

## 2022-01-01 RX ADMIN — INSULIN LISPRO 2 UNITS: 100 INJECTION, SOLUTION INTRAVENOUS; SUBCUTANEOUS at 11:40

## 2022-01-01 RX ADMIN — ENOXAPARIN SODIUM 40 MG: 100 INJECTION SUBCUTANEOUS at 20:24

## 2022-01-01 RX ADMIN — NEBIVOLOL HYDROCHLORIDE 2.5 MG: 5 TABLET ORAL at 13:47

## 2022-01-01 RX ADMIN — RISPERIDONE 0.5 MG: 1 TABLET, FILM COATED ORAL at 09:17

## 2022-01-01 RX ADMIN — INSULIN LISPRO 2 UNITS: 100 INJECTION, SOLUTION INTRAVENOUS; SUBCUTANEOUS at 12:18

## 2022-01-01 RX ADMIN — TAZOBACTAM SODIUM AND PIPERACILLIN SODIUM 3.38 G: 375; 3 INJECTION, SOLUTION INTRAVENOUS at 00:30

## 2022-01-01 RX ADMIN — ENOXAPARIN SODIUM 40 MG: 100 INJECTION SUBCUTANEOUS at 21:11

## 2022-01-01 RX ADMIN — IOPAMIDOL 100 ML: 755 INJECTION, SOLUTION INTRAVENOUS at 02:48

## 2022-01-01 RX ADMIN — INSULIN LISPRO 2 UNITS: 100 INJECTION, SOLUTION INTRAVENOUS; SUBCUTANEOUS at 08:18

## 2022-01-01 RX ADMIN — TAZOBACTAM SODIUM AND PIPERACILLIN SODIUM 3.38 G: 375; 3 INJECTION, SOLUTION INTRAVENOUS at 08:46

## 2022-01-01 RX ADMIN — INSULIN LISPRO 4 UNITS: 100 INJECTION, SOLUTION INTRAVENOUS; SUBCUTANEOUS at 11:34

## 2022-01-01 RX ADMIN — TAZOBACTAM SODIUM AND PIPERACILLIN SODIUM 3.38 G: 375; 3 INJECTION, SOLUTION INTRAVENOUS at 09:56

## 2022-01-01 RX ADMIN — ENOXAPARIN SODIUM 40 MG: 100 INJECTION SUBCUTANEOUS at 21:04

## 2022-01-01 RX ADMIN — LISINOPRIL 20 MG: 20 TABLET ORAL at 09:54

## 2022-01-01 RX ADMIN — SODIUM CHLORIDE 250 MG: 9 INJECTION, SOLUTION INTRAVENOUS at 08:01

## 2022-01-01 RX ADMIN — SACUBITRIL AND VALSARTAN 1 TABLET: 24; 26 TABLET, FILM COATED ORAL at 20:30

## 2022-01-01 RX ADMIN — TAZOBACTAM SODIUM AND PIPERACILLIN SODIUM 3.38 G: 375; 3 INJECTION, SOLUTION INTRAVENOUS at 21:10

## 2022-01-01 RX ADMIN — RISPERIDONE 0.5 MG: 1 TABLET, FILM COATED ORAL at 09:29

## 2022-01-01 RX ADMIN — LEVOTHYROXINE SODIUM 125 MCG: 125 TABLET ORAL at 05:34

## 2022-01-01 RX ADMIN — ATORVASTATIN CALCIUM 10 MG: 10 TABLET, FILM COATED ORAL at 09:17

## 2022-01-01 RX ADMIN — TAZOBACTAM SODIUM AND PIPERACILLIN SODIUM 3.38 G: 375; 3 INJECTION, SOLUTION INTRAVENOUS at 09:17

## 2022-01-01 RX ADMIN — LISINOPRIL 20 MG: 20 TABLET ORAL at 09:29

## 2022-01-01 RX ADMIN — LINAGLIPTIN 5 MG: 5 TABLET, FILM COATED ORAL at 09:17

## 2022-01-01 RX ADMIN — BUMETANIDE 1 MG: 0.25 INJECTION, SOLUTION INTRAMUSCULAR; INTRAVENOUS at 20:45

## 2022-01-01 RX ADMIN — SACUBITRIL AND VALSARTAN 1 TABLET: 24; 26 TABLET, FILM COATED ORAL at 08:17

## 2022-01-01 RX ADMIN — Medication 10 ML: at 08:17

## 2022-01-01 RX ADMIN — IOPAMIDOL 100 ML: 612 INJECTION, SOLUTION INTRAVENOUS at 17:28

## 2022-01-01 RX ADMIN — BUMETANIDE 1 MG: 0.25 INJECTION, SOLUTION INTRAMUSCULAR; INTRAVENOUS at 21:03

## 2022-01-01 RX ADMIN — TAZOBACTAM SODIUM AND PIPERACILLIN SODIUM 3.38 G: 375; 3 INJECTION, SOLUTION INTRAVENOUS at 10:55

## 2022-01-01 RX ADMIN — TAZOBACTAM SODIUM AND PIPERACILLIN SODIUM 3.38 G: 375; 3 INJECTION, SOLUTION INTRAVENOUS at 01:07

## 2022-01-01 RX ADMIN — ATORVASTATIN CALCIUM 10 MG: 10 TABLET, FILM COATED ORAL at 09:54

## 2022-01-01 RX ADMIN — BUMETANIDE 1 MG: 0.25 INJECTION, SOLUTION INTRAMUSCULAR; INTRAVENOUS at 09:55

## 2022-01-01 RX ADMIN — Medication 10 ML: at 08:01

## 2022-01-01 RX ADMIN — LEVOTHYROXINE SODIUM 125 MCG: 125 TABLET ORAL at 05:29

## 2022-01-01 RX ADMIN — INSULIN LISPRO 2 UNITS: 100 INJECTION, SOLUTION INTRAVENOUS; SUBCUTANEOUS at 09:17

## 2022-01-01 RX ADMIN — TAZOBACTAM SODIUM AND PIPERACILLIN SODIUM 3.38 G: 375; 3 INJECTION, SOLUTION INTRAVENOUS at 00:53

## 2022-01-01 RX ADMIN — HYDROXYZINE HYDROCHLORIDE 25 MG: 25 TABLET, FILM COATED ORAL at 05:37

## 2022-01-01 RX ADMIN — GLIPIZIDE 10 MG: 10 TABLET ORAL at 09:17

## 2022-01-01 RX ADMIN — LINAGLIPTIN 5 MG: 5 TABLET, FILM COATED ORAL at 09:54

## 2022-01-01 RX ADMIN — NEBIVOLOL HYDROCHLORIDE 2.5 MG: 5 TABLET ORAL at 08:17

## 2022-01-01 RX ADMIN — ENOXAPARIN SODIUM 40 MG: 100 INJECTION SUBCUTANEOUS at 20:45

## 2022-01-01 RX ADMIN — RISPERIDONE 0.5 MG: 1 TABLET, FILM COATED ORAL at 08:17

## 2022-01-01 RX ADMIN — Medication 10 ML: at 21:04

## 2022-04-01 NOTE — TELEPHONE ENCOUNTER
Called Pt to remind him to bring disc of testicular US.   Spoke with mother. (Previous consent to speak with mother obtained; see referral communications)     PT to call back and reschedule if disc cannot be obtained before appointment on 04/01/2022.

## 2022-04-04 NOTE — PROGRESS NOTES
Subjective    Mr. Andersen is 52 y.o. male    Chief Complaint: Scrotal swelling    History of Present Illness  Patient is a 52-year-old gentleman who was accompanied by his mother.  Patient has not a good historian.  He is mentally delayed.  He was seen at [facility] emergency department for scrotal swelling no pain.  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  He is also complaining about generalized swelling and/or edema both in bilateral lower extremities and upper extremities and abdomen.  He was put on antibiotic at that time he is here for follow-up.    The following portions of the patient's history were reviewed and updated as appropriate: allergies, current medications, past family history, past medical history, past social history, past surgical history and problem list.    Review of Systems   Constitutional: Negative for appetite change and fever.   HENT: Negative for hearing loss and sore throat.    Eyes: Negative for pain and redness.   Respiratory: Negative for cough and shortness of breath.    Cardiovascular: Negative for chest pain and leg swelling.   Gastrointestinal: Negative for anal bleeding, nausea and vomiting.   Endocrine: Negative for cold intolerance and heat intolerance.   Genitourinary: Positive for testicular pain. Negative for decreased urine volume, difficulty urinating, dysuria, enuresis, flank pain, frequency, genital sores, hematuria, penile discharge, penile pain, penile swelling, scrotal swelling and urgency.   Musculoskeletal: Negative for joint swelling and myalgias.   Skin: Negative for color change and rash.   Allergic/Immunologic: Negative for immunocompromised state.   Neurological: Negative for dizziness and speech difficulty.   Hematological: Negative for adenopathy.  Does not bruise/bleed easily.   Psychiatric/Behavioral: Negative for dysphoric mood and suicidal ideas.         Current Outpatient Medications:   •  atorvastatin (LIPITOR) 10 MG tablet, Take 10 mg by mouth Daily., Disp: , Rfl:   •  benazepril (LOTENSIN) 20 MG tablet, Take 20 mg by mouth Daily., Disp: , Rfl:   •  Farxiga 5 MG tablet tablet, Take 5 mg by mouth Daily., Disp: , Rfl:   •  glipizide (GLUCOTROL) 10 MG tablet, Take 10 mg by mouth Daily., Disp: , Rfl:   •  hydrOXYzine (ATARAX) 25 MG tablet, , Disp: , Rfl:   •  Januvia 100 MG tablet, Take 100 mg by mouth Daily., Disp: , Rfl:   •  levothyroxine (SYNTHROID, LEVOTHROID) 125 MCG tablet, Take 125 mcg by mouth Daily., Disp: , Rfl:   •  metFORMIN (GLUCOPHAGE) 1000 MG tablet, Take 1,000 mg by mouth 2 (Two) Times a Day., Disp: , Rfl:   •  pioglitazone (ACTOS) 15 MG tablet, Take 15 mg by mouth Daily., Disp: , Rfl:   •  risperiDONE (risperDAL) 0.5 MG tablet, Take 0.5 mg by mouth Daily., Disp: , Rfl:   •  ciprofloxacin (Cipro) 500 MG tablet, Take 1 tablet by mouth 2 (Two) Times a Day for 14 days., Disp: 28 tablet, Rfl: 0    Past Medical History:   Diagnosis Date   • Hormone disorder    • Hypertension        Past Surgical History:   Procedure Laterality Date   • CYST REMOVAL     • EAR TUBES     • FINGER FUSION         Social History     Socioeconomic History   • Marital status: Single   Tobacco Use   • Smoking status: Never Smoker   Vaping Use   • Vaping Use: Never used   Substance and Sexual Activity   • Drug use: Never   • Sexual activity: Never       Family History   Problem Relation Age of Onset   • Heart disease Father    • Hypertension Father    • Hypertension Mother    • No Known Problems Maternal Aunt    • No Known Problems Maternal Uncle    • No Known Problems Paternal Aunt    • No Known Problems Paternal Uncle    • No Known Problems Paternal Grandmother    • No Known Problems Paternal Grandfather    • No Known Problems Maternal Grandmother    • No Known Problems  "Maternal Grandfather        Objective    Temp 98.6 °F (37 °C)   Ht 177.8 cm (70\")   Wt 99.8 kg (220 lb)   BMI 31.57 kg/m²     Physical Exam  Vitals reviewed.   Constitutional:       General: He is not in acute distress.  HENT:      Head: Normocephalic and atraumatic.      Nose: No congestion.   Pulmonary:      Effort: Pulmonary effort is normal.   Abdominal:      General: There is distension.   Genitourinary:     Comments: Patient has a partial buried penis he does have scrotal swelling as well as scrotal skin thickening.  I do not palpate or feel any obvious fluctuance or any evidence of abscess I felt no hard firm mass.  He was nontender with examination.  May have element of anasarca.  Musculoskeletal:      Right lower leg: Edema present.      Left lower leg: Edema present.      Comments: Also bilateral hands appear puffy and edematous.   Neurological:      Mental Status: He is alert. Mental status is at baseline.      Comments: Patient is developmentally delayed.             No results found for this or any previous visit.  Assessment and Plan    Diagnoses and all orders for this visit:    1. Pain in testicle, unspecified laterality (Primary)  -     POC Urinalysis Dipstick, Multipro  -     US Scrotum & Testicles; Future  -     ciprofloxacin (Cipro) 500 MG tablet; Take 1 tablet by mouth 2 (Two) Times a Day for 14 days.  Dispense: 28 tablet; Refill: 0    2. Scrotal swelling  -     US Scrotum & Testicles; Future  -     ciprofloxacin (Cipro) 500 MG tablet; Take 1 tablet by mouth 2 (Two) Times a Day for 14 days.  Dispense: 28 tablet; Refill: 0    3. Generalized edema    There was only pain with examination but overall he has no discomfort in his testicles or scrotum the swelling is not severe I do not see any infectious source for this he has a thickened scrotal skin at this point I will treat with course of Cipro I want him to return with a more recent scrotal ultrasound to see if there is been any interval " change.  He has significant edema of the lower extremities bilateral hands are also edematous and is abdomen appears distended I told him he needs to follow-up in the next week or 2 with his primary care physician I feel he would benefit from a diuretic I explained that this is not a urologic problem needs to be addressed by his primary care physician.  Also some of the scrotal swelling may be just fluid from drainage in the scrotal area and therefore is anasarca.  However he will return with scrotal ultrasound to assess for any interval change.

## 2022-04-05 NOTE — TELEPHONE ENCOUNTER
Faxed order to Radiology at [facility]'s Radiology Dept.  Patient is wanting to have scrotal U/S done there.

## 2022-04-27 NOTE — PROGRESS NOTES
"Chief Complaint:   Chief Complaint   Patient presents with   • Elevated Hepatic Enzymes     Pt presents today for evaluation for elevated LFT's-pt had routine labs for PCP that showed this         Patient ID: Garrison Andersen is a 52 y.o. male     History of Present Illness: This very pleasant 52-year-old male who was referred to our office for findings of elevated liver enzymes.    The patient states that he was seen by his family physician for his annual labs and \"it should that my liver enzymes were elevated.\"  Labs on 4/13/2022 CBC revealed a WBC of 11.1, hemoglobin 10 elevated WBC with left shift.  AST of 42 ALT of 31.  Triglycerides within normal limits.  Lipase within normal limits.  BNP within normal limits.  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  The patient's mother tells me today \"somewhere around [place], I think December 28 2021 he had COVID-19 and he seemed to be getting better but then he began swelling and so I took him to [facility] in [facility] and we were told to take him to the PCP and he was given Lasix to help with the swelling and I feel he is doing much better.\"  The patient's mother tells me \"he had really bad scrotal edema.\"  The patient tells me today \"when I had to pee I had to squeeze my penis because scrotum was so swollen and I am peeing a little bit more, maybe half of normal, but I am not drinking very much water and I drink 1 Gatorade a day.  When I feel dehydrated that is when I get the Gatorade.\"  The patient's mother tells me \"I am not seeing him drink anything.\"  The patient's mother tells me that while he had COVID 19 somewhere around 12/21/2021 the patient began having mostly \"a lot of cough and congestion and now he does not eat or drink as much as he used to.\"  The patient's mother tells me that he has lost approximately 15 to 20 pounds since COVID.  The patient " "states \"I probably weighed around the 230s I do not know exactly but when I went to the hospital when I was all swollen I weighed 255 pounds.\"  \"My legs are so tight it hurts me to walk.\"  The patient is also updated on flu vaccine 2021.    The patient's mother tells me that KEN Pritchard in [facility] referred the patient to have an ultrasound of the abdomen as white blood cells were elevated.  Performed on 4/15/2022 at [facility] in [facility].  I will have the scanned into the chart.  There were findings of a 3.8 sonometer cyst of the right kidney.  The right kidney measured 12.8 cm in length.  Pancreas normal.  Left kidney measured 12.6 cm in length.  Noted normal thickness of the cortex with no hydronephrosis.  Gallbladder ultrasound revealed a 5 mm polyp demonstrated in the gallbladder wall and 4 mm polyp on the other wall of the gallbladder.  The common bile duct was 4 mm wide.  HIDA scan was also performed but there is no note of ejection fraction.  The patient also underwent ultrasound of scrotum on 4/15/2022 at the same above-noted center with findings of 1.8 cm cyst in the lower pole of the right testes.  The right testes measured 4 x 2.8 cm in size.  The right epididymis was 14 mm in width.  There were no masses in the right testes and blood flow was normal to the testes.  There was a very large cyst inferiorly off the lower pole of the right testes.  The left testes measured 3.9 x 2.6 cm in size.  No masses positive blood flow to the testes.  Left epididymis measured 13 mm in width.  There was no malignant appearing abnormality identified in the scrotum.  The patient did bring with him the discs of the flat x-ray of lower abdominal and scrotal area.      Past Medical History:   Diagnosis Date   • Congenital brain damage (HCC) 1970    The patient's mother tells me today \"my son was born with brain damage.\"   • Family history of colon cancer    • Family history of " colonic polyps    • History of COVID-19 12/28/2021    Patient received Moderna [id number] vaccine 7/21/2021 [employer], second dose Moderna [id number] 8/24/2021 [facility]   • Hormone disorder    • Hyperlipidemia    • Hypertension    • Special educational needs    • Type 2 diabetes mellitus (HCC)        Past Surgical History:   Procedure Laterality Date   • CYST REMOVAL     • EAR TUBES     • FINGER FUSION           Current Outpatient Medications:   •  atorvastatin (LIPITOR) 10 MG tablet, Take 10 mg by mouth Daily., Disp: , Rfl:   •  benazepril (LOTENSIN) 20 MG tablet, Take 20 mg by mouth Daily., Disp: , Rfl:   •  Farxiga 5 MG tablet tablet, Take 5 mg by mouth Daily., Disp: , Rfl:   •  glipizide (GLUCOTROL) 10 MG tablet, Take 10 mg by mouth Daily., Disp: , Rfl:   •  hydrOXYzine (ATARAX) 25 MG tablet, Take 25 mg by mouth Every 6 (Six) Hours As Needed., Disp: , Rfl:   •  Januvia 100 MG tablet, Take 100 mg by mouth Daily., Disp: , Rfl:   •  levothyroxine (SYNTHROID, LEVOTHROID) 125 MCG tablet, Take 125 mcg by mouth Daily., Disp: , Rfl:   •  metFORMIN (GLUCOPHAGE) 1000 MG tablet, Take 1,000 mg by mouth 2 (Two) Times a Day., Disp: , Rfl:   •  multivitamin with minerals (Multivitamin Adults) tablet tablet, Take 1 tablet by mouth Daily., Disp: , Rfl:   •  pioglitazone (ACTOS) 15 MG tablet, Take 15 mg by mouth Daily., Disp: , Rfl:   •  risperiDONE (risperDAL) 0.5 MG tablet, Take 0.5 mg by mouth Daily., Disp: , Rfl:     No Known Allergies    Social History     Socioeconomic History   • Marital status: Single   Tobacco Use   • Smoking status: Never Smoker   • Smokeless tobacco: Never Used   Vaping Use   • Vaping Use: Never used   Substance and Sexual Activity   • Alcohol use: Not Currently   • Drug use: Never   • Sexual activity: Never       Family History   Problem Relation Age of Onset   • Hypertension Mother    • Colon polyps Father         > 60 years of age   • Heart disease Father    • Hypertension Father    • No Known Problems  "Maternal Aunt    • No Known Problems Maternal Uncle    • No Known Problems Paternal Aunt    • No Known Problems Paternal Uncle    • Diverticulitis Maternal Grandmother    • Colon cancer Maternal Grandfather         In mid-late 60's   • No Known Problems Paternal Grandmother    • No Known Problems Paternal Grandfather    • Hemochromatosis Neg Hx    • Liver disease Neg Hx    • Liver cancer Neg Hx    • Rectal cancer Neg Hx    • Stomach cancer Neg Hx        Vitals:    04/27/22 1324   BP: 138/72   BP Location: Left arm   Patient Position: Sitting   Cuff Size: Adult   Pulse: 79   Temp: 97.1 °F (36.2 °C)   TempSrc: Infrared   SpO2: 98%   Weight: 99.8 kg (220 lb)   Height: 177.8 cm (70\")       Review of Systems:    General:    Present -\"I just feel really tired and it is hard to walk.\"   Skin:    Not Present-Rash   HEENT:     Not Present-Acute visual changes or Acute hearing changes   Neck :    Not Present- swollen glands   Genitourinary:      Not Present- burning, frequency, urgency hematuria, dysuria.  \"I am not peeing as much.\",   Cardiovascular:   Not Present-chest pain, palpitations, or pressure   Respiratory:   Not Present- shortness of breath or cough   Gastrointestinal:  Musculoskeletal:  Neurological:  Psychiatric:   Present as mentioned in the HP    Not Present. Recent gait disturbances.    Not Present-\"my legs feel really tired and tight and weak.\"    Not Present- Anxiety or Depression.       Physical Exam:    General Appearance:    Alert, cooperative, in no acute distress   Psych:    Mood appropriate    Eyes:          conjunctivae and sclerae normal, no   icterus, no pallor   ENMT:    Ears appear intact with no abnormalities noted oral mucosa moist   Neck:   No adenopathy, supple, trachea midline, no thyromegaly, no   carotid bruit, no JVD    Cardiovascular:    Regular rhythm and normal rate, normal S1 and S2, positive murmur, no gallop, no rub, no click.  4+ pitting edema bilateral lower extremities.  Bilateral " pedal edema.  Scrotal edema.  Dependent edema buttocks.   Gastrointestinal:     Inspection normal.  Normal bowel sounds, no masses, no organomegaly, distended, rigid, no fluid wave, no tenderness no guarding, no hepatosplenomegaly.   Skin:  Questionable petechiae on buttocks, redness around umbilicus.   Lungs:  Decreased bilateral       Assessment and Plan:  Assessment/Plan   Diagnoses and all orders for this visit:    1. Bilateral lower extremity edema (Primary)    2. Abdominal distention    3. Generalized abdominal rigidity    4. Pedal edema  Comments:  Bilateral    5. Scrotal edema    6. History of COVID-19    7. Decreased lung sounds    8. Weight loss    9. Loss of appetite    10. Abnormal finding on imaging  Comments:  Kidney and testicle    11. Malaise and fatigue    12. Family history of congestive heart failure  Comments:  Father 70 years old    13. Murmur    14. Dependent edema    Due to my findings with the patient I feel that it is best I refer him to the ER.  He needs an extensive work-up.  At this point he is not acute however he needs medical care.  Both the patient and his mother understand and are in agreement with the plan of care and give consent.  I will place a call to the ER to give report.  I have called for records from [facility] in [facility] however I have not received their fax yet.  Those records do have labs and imaging according to the patient and his mother.    Addendum: Gave report to Candelaria in the ER at 1550       There are no Patient Instructions on file for this visit.    Next follow-up appointment        EMR Dragon/Transcription disclaimer:  Much of this encounter note is an electronic transcription/translation of spoken language to printed text. The electronic translation of spoken language may permit erroneous, or at times, nonsensical words or phrases to be inadvertently transcribed; although I have reviewed the note for such errors, some may still exist.

## 2022-05-25 NOTE — PROGRESS NOTES
I spoke with the patients wife regarding an incidental finding seen on a recent imaging exam.The patients wife verbalized understanding the Radiologist's recommendations for follow-up. Per the patients wife request, the report was routed to the PCP.

## 2022-08-25 PROBLEM — R60.1 ANASARCA: Status: ACTIVE | Noted: 2022-01-01

## 2022-08-25 PROBLEM — I10 ESSENTIAL HYPERTENSION: Status: ACTIVE | Noted: 2022-01-01

## 2022-08-25 PROBLEM — R79.89 ELEVATED D-DIMER: Status: ACTIVE | Noted: 2022-01-01

## 2022-08-25 PROBLEM — I21.A1 TYPE 2 MYOCARDIAL INFARCTION DUE TO HEART FAILURE: Status: ACTIVE | Noted: 2022-01-01

## 2022-08-25 PROBLEM — E80.6 HYPERBILIRUBINEMIA: Status: ACTIVE | Noted: 2022-01-01

## 2022-08-25 PROBLEM — E11.65 TYPE 2 DIABETES MELLITUS WITH HYPERGLYCEMIA, WITHOUT LONG-TERM CURRENT USE OF INSULIN: Status: ACTIVE | Noted: 2022-01-01

## 2022-08-25 PROBLEM — I50.9 TYPE 2 MYOCARDIAL INFARCTION DUE TO HEART FAILURE: Status: ACTIVE | Noted: 2022-01-01

## 2022-08-25 PROBLEM — I50.9 ACUTE HEART FAILURE: Status: ACTIVE | Noted: 2022-01-01

## 2022-08-25 PROBLEM — R74.01 TRANSAMINITIS: Status: ACTIVE | Noted: 2022-01-01

## 2022-08-25 PROBLEM — R09.02 HYPOXIA: Status: ACTIVE | Noted: 2022-01-01

## 2022-08-26 PROBLEM — I50.9 CHF (CONGESTIVE HEART FAILURE): Status: ACTIVE | Noted: 2022-01-01

## 2022-08-29 PROBLEM — I50.33 ACUTE ON CHRONIC DIASTOLIC HEART FAILURE: Status: ACTIVE | Noted: 2022-01-01

## 2022-08-29 PROBLEM — I31.39 PERICARDIAL EFFUSION WITHOUT CARDIAC TAMPONADE: Status: ACTIVE | Noted: 2022-01-01

## 2022-08-29 PROBLEM — E66.9 OBESITY (BMI 30-39.9): Status: ACTIVE | Noted: 2022-01-01

## 2022-08-30 ENCOUNTER — READMISSION MANAGEMENT (OUTPATIENT)
Dept: CALL CENTER | Facility: HOSPITAL | Age: 52
End: 2022-08-30

## 2022-08-30 LAB
ALBUMIN SERPL ELPH-MCNC: 3.4 G/DL (ref 2.9–4.4)
ALBUMIN/GLOB SERPL: 1.3 {RATIO} (ref 0.7–1.7)
ALPHA1 GLOB SERPL ELPH-MCNC: 0.3 G/DL (ref 0–0.4)
ALPHA2 GLOB SERPL ELPH-MCNC: 0.6 G/DL (ref 0.4–1)
B-GLOBULIN SERPL ELPH-MCNC: 0.9 G/DL (ref 0.7–1.3)
GAMMA GLOB SERPL ELPH-MCNC: 0.9 G/DL (ref 0.4–1.8)
GLOBULIN SER CALC-MCNC: 2.6 G/DL (ref 2.2–3.9)
KAPPA LC FREE SER-MCNC: 41.5 MG/L (ref 3.3–19.4)
KAPPA LC FREE/LAMBDA FREE SER: 2.37 {RATIO} (ref 0.26–1.65)
LABORATORY COMMENT REPORT: NORMAL
LAMBDA LC FREE SERPL-MCNC: 17.5 MG/L (ref 5.7–26.3)
M PROTEIN SERPL ELPH-MCNC: NORMAL G/DL
PROT PATTERN SERPL ELPH-IMP: NORMAL
PROT SERPL-MCNC: 6 G/DL (ref 6–8.5)

## 2022-08-30 NOTE — OUTREACH NOTE
Prep Survey    Flowsheet Row Responses   [facility] facility patient discharged from? [place]   Is LACE score < 7 ? No   Emergency Room discharge w/ pulse ox? No   Eligibility Readm Mgmt   Discharge diagnosis Anasarca   Does the patient have one of the following disease processes/diagnoses(primary or secondary)? CHF   Does the patient have Home health ordered? No   Is there a DME ordered? Yes   What DME was ordered? Oxygen   Prep survey completed? Yes          AMARILIS RAMÍREZ - Registered Nurse

## 2022-08-31 ENCOUNTER — DOCUMENTATION (OUTPATIENT)
Dept: CT IMAGING | Facility: HOSPITAL | Age: 52
End: 2022-08-31

## 2022-08-31 LAB
ALBUMIN 24H MFR UR ELPH: 51.4 %
ALPHA1 GLOB 24H MFR UR ELPH: 4.2 %
ALPHA2 GLOB 24H MFR UR ELPH: 14.6 %
B-GLOBULIN 24H MFR UR ELPH: 16.1 %
GAMMA GLOB 24H MFR UR ELPH: 13.7 %
KAPPA LC FREE 24H UR-MRATE: 172.87 MG/24 HR
KAPPA LC FREE UR-MCNC: 123.48 MG/L (ref 1.17–86.46)
KAPPA LC FREE/LAMBDA FREE UR: 11.34 (ref 1.83–14.26)
LABORATORY COMMENT REPORT: ABNORMAL
LAMBDA LC FREE 24H UR-MRATE: 15.25 MG/24 HR
LAMBDA LC FREE UR-MCNC: 10.89 MG/L (ref 0.27–15.21)
M PROTEIN 24H MFR UR ELPH: ABNORMAL %
PROT 24H UR-MRATE: 253 MG/24 HR (ref 30–150)
PROT UR-MCNC: 18.1 MG/DL

## 2022-08-31 NOTE — PROGRESS NOTES
Message for patient  A notification letter was sent to the patient explaining that a recent imaging exam showed an incidental finding, for which follow-up testing was recommended.

## 2022-09-01 ENCOUNTER — READMISSION MANAGEMENT (OUTPATIENT)
Dept: CALL CENTER | Facility: HOSPITAL | Age: 52
End: 2022-09-01

## 2022-09-01 NOTE — OUTREACH NOTE
CHF Week 1 Survey    Flowsheet Row Responses   [facility] facility patient discharged from? [place]   Does the patient have one of the following disease processes/diagnoses(primary or secondary)? CHF   CHF Week 1 attempt successful? No   Unsuccessful attempts Attempt 1   Revoke Patient           BARBARA PARRA - Registered Nurse

## 2022-09-02 LAB
IGA SERPL-MCNC: 117 MG/DL (ref 90–386)
IGE SERPL-ACNC: 40 IU/ML (ref 6–495)
IGG SERPL-MCNC: 902 MG/DL (ref 603–1613)
IGM SERPL-MCNC: 26 MG/DL (ref 20–172)